# Patient Record
Sex: FEMALE | Race: WHITE | Employment: FULL TIME | URBAN - METROPOLITAN AREA
[De-identification: names, ages, dates, MRNs, and addresses within clinical notes are randomized per-mention and may not be internally consistent; named-entity substitution may affect disease eponyms.]

---

## 2020-03-25 ENCOUNTER — HOSPITAL ENCOUNTER (OUTPATIENT)
Dept: PHYSICAL THERAPY | Age: 45
Discharge: HOME | End: 2020-03-25
Payer: OTHER GOVERNMENT

## 2020-03-25 PROCEDURE — 97162 PT EVAL MOD COMPLEX 30 MIN: CPT

## 2020-03-25 NOTE — PROGRESS NOTES
PHYSICAL THERAPY - DAILY TREATMENT NOTE    Patient Name: Ismael West        Date: 3/25/2020  : 1975   YES Patient  Verified  Visit #:   1   of   8  Insurance: Payor:  / Plan: [employer]IRE[employer]PENDENTS / Product Type:  /      In time: 7:30 Out time: 8:00   Total Treatment Time: 30     Medicare Time Tracking (below)   Total Timed Codes (min):  na 1:1 Treatment Time:  na     TREATMENT AREA =  Neck pain [M54.2]    SUBJECTIVE  Pain Level (on 0 to 10 scale):    Medication Changes/New allergies or changes in medical history, any new surgeries or procedures? NO    If yes, update Summary List   Subjective Functional Status/Changes:  []  No changes reported     SEE IE          OBJECTIVE    5 min Self Care: T/s rot, chin tuck, diaphragmatic breath   Rationale:    increase ROM, increase strength and improve coordination to improve the patients ability to perform ADLs      Billed With/As:   [] TE   [] TA   [] Neuro   [x] Self Care Patient Education: [x] Review HEP    [] Progressed/Changed HEP based on:   [] positioning   [] body mechanics   [] transfers   [] heat/ice application    [] other:       min Patient Education:  YES  Reviewed HEP   []  Progressed/Changed HEP based on: Other Objective/Functional Measures:    SEE IE     Post Treatment Pain Level (on 0 to 10) scale:       ASSESSMENT  Assessment/Changes in Function:     SEE IE     []  See Progress Note/Recertification   Patient will continue to benefit from skilled PT services to modify and progress therapeutic interventions, address functional mobility deficits, address ROM deficits, address strength deficits, analyze and address soft tissue restrictions, analyze and cue movement patterns, analyze and modify body mechanics/ergonomics and assess and modify postural abnormalities to attain remaining goals.    Progress toward goals / Updated goals:         PLAN  []  Upgrade activities as tolerated YES Continue plan of care []  Discharge due to :    []  Other:      Therapist: Kenna Osler, PT, OCS, SCS, CSCS    Date: 3/25/2020 Time: 8:21 AM       No future appointments.

## 2020-03-25 NOTE — PROGRESS NOTES
[address] PHYSICAL THERAPY  [address] [place] [address] [address],[place], [address] - Phone: [phone]  Fax: [phone] / [phone] [facility]  Patient Name: Merlinda Rutter : 1975   Medical   Diagnosis: Neck pain [M54.2] Treatment Diagnosis: Neck pain [M54.2]   Onset Date: chronic     Referral Source: Sudeep Stallings MD [place] [facility]): 3/25/2020   Prior Hospitalization: See medical history Provider #: [id number]   Prior Level of Function: Pain with prolonged sitting   Comorbidities: Fibromyalgia, Depression, DM, Thyroid problem, HTN, Sleep apnea   Medications: Verified on Patient Summary List   The Plan of Care and following information is based on the information from the initial evaluation.   ===========================================================================================  Assessment / kim information:  Merlinda Rutter is a 38 y.o.  yo female with Dx of Neck pain [M54.2]. She reports chronic Hx of neck pain. She currently rates her pain as 10/10 at worst, 2/10 at best, primarily located at L cervical region as well as occasional B UE pain down to her ulnar side of B UE (L>R). She complains of difficulty and increase pain with prolonged sitting. Objective Findings:  Cervical ROM: Flx  = WNL, Ext = limited by 20%,  Rot: R = limited by 20%, L =limited by 30%. However all PROM are WNL. Manual Muscle Testing: All UE strength are WNL. Palpation: reproduction of L shoulder pain with palpation to L infraspinatus. Due to the pandemic circumstance with Covid19, she was only instructed in HEP.   Pt will be contacted weekly to discuss the progress and home exercise program.    ===========================================================================================  Eval Complexity: History HIGH Complexity :3+ comorbidities / personal factors will impact the outcome/ POC ;  Examination  MEDIUM Complexity : 3 Standardized tests and measures addressing body structure, function, activity limitation and / or participation in recreation ; Presentation MEDIUM Complexity : Evolving with changing characteristics ; Decision Making MEDIUM Complexity : FOTO score of 26-74; Overall Complexity MEDIUM  Problem List: pain affecting function, decrease ROM, decrease strength, decrease ADL/ functional abilitiies, decrease activity tolerance and decrease flexibility/ joint mobility   Treatment Plan may include any combination of the following: Therapeutic exercise, Therapeutic activities, Neuromuscular re-education, Physical agent/modality, Manual therapy, Patient education, Self Care training and Functional mobility training  Patient / Family readiness to learn indicated by: asking questions  Persons(s) to be included in education: patient (P)  Barriers to Learning/Limitations: None  Measures taken, if barriers to learning:    Patient Goal (s): Decrease pain    Patient self reported health status: good  Rehabilitation Potential: fair   Short Term Goals: To be accomplished in  4  weeks:  1. Independent with HEP. 2. Decrease max pain 25-50% to assist with ADLs    Frequency / Duration:   Patient to be seen  As necessary:  Patient / Caregiver education and instruction: self care and exercises    Therapist Signature: Presley De Luna, DPT, OCS, SCS, CSCS Date: 7/36/3632   Certification Period: na Time: 8:25 AM   ==================================================================================  I certify that the above Physical Therapy Services are being furnished while the patient is under my care. I agree with the treatment plan and certify that this therapy is necessary. Physician Signature:        Date:       Time:     Please sign and return to [facility] or you may fax the signed copy to [phone]. Thank you.

## 2020-08-04 NOTE — PROGRESS NOTES
[address] PHYSICAL THERAPY  [address] [place] [address] [address], [address],[facility], [address] [facility] - Phone: [phone]  Fax: [phone] SUMMARY  Patient Name: Carine Moreno : 1975   Treatment/Medical Diagnosis: Neck pain [M54.2]   Referral Source: Kelsea Valverde MD     Date of Initial Visit: 3/25/20 Attended Visits: 1 Missed Visits: 0     SUMMARY OF TREATMENT  Carine Moreno was been seen at our clinic for initial evaluation. CURRENT STATUS  Pt did not return to PT treatment after the 1st visit. RECOMMENDATIONS  Discharge from physical therapy treatment with HEP. If you have any questions/comments please contact us directly at [phone]. Thank you for allowing us to assist in the care of your patient.     Therapist Signature: Bambi Xavier DPT, OCS, SCS, CSCS Date: 2020     Time: 3:53 PM